# Patient Record
Sex: FEMALE | Race: WHITE | NOT HISPANIC OR LATINO | URBAN - METROPOLITAN AREA
[De-identification: names, ages, dates, MRNs, and addresses within clinical notes are randomized per-mention and may not be internally consistent; named-entity substitution may affect disease eponyms.]

---

## 2022-06-14 ENCOUNTER — APPOINTMENT (OUTPATIENT)
Age: 37
Setting detail: DERMATOLOGY
End: 2022-06-14

## 2022-06-14 ENCOUNTER — APPOINTMENT (RX ONLY)
Age: 37
Setting detail: DERMATOLOGY
End: 2022-06-14

## 2022-06-14 DIAGNOSIS — Z41.9 ENCOUNTER FOR PROCEDURE FOR PURPOSES OTHER THAN REMEDYING HEALTH STATE, UNSPECIFIED: ICD-10-CM

## 2022-06-14 PROCEDURE — ? HYALURONIDASE INJECTION

## 2022-06-14 ASSESSMENT — LOCATION ZONE DERM
LOCATION ZONE: LIP
LOCATION ZONE: LIP

## 2022-06-14 ASSESSMENT — LOCATION SIMPLE DESCRIPTION DERM
LOCATION SIMPLE: RIGHT LIP
LOCATION SIMPLE: LEFT LIP
LOCATION SIMPLE: RIGHT LIP
LOCATION SIMPLE: LEFT LIP

## 2022-06-14 ASSESSMENT — LOCATION DETAILED DESCRIPTION DERM
LOCATION DETAILED: LEFT INFERIOR VERMILION LIP
LOCATION DETAILED: RIGHT SUPERIOR VERMILION LIP
LOCATION DETAILED: RIGHT SUPERIOR VERMILION LIP
LOCATION DETAILED: LEFT SUPERIOR VERMILION LIP
LOCATION DETAILED: LEFT SUPERIOR VERMILION LIP
LOCATION DETAILED: LEFT INFERIOR VERMILION LIP

## 2022-06-14 NOTE — PROCEDURE: HYALURONIDASE INJECTION
Detail Level: Detailed
Hyaluronidase Preparation: hyaluronidase
Administered By (Optional): Jim Lane DO
Lot # (Optional): [id number]
Price (Use Numbers Only, No Special Characters Or $): 75
Total Volume (Ccs): 0.2
Expiration Date (Optional): 08/2021
Consent: The risks of contour defects and dimpling of the skin were reviewed with the patient prior to the injection.

## 2022-06-14 NOTE — PROCEDURE: HYALURONIDASE INJECTION
Total Volume (Ccs): 0.2
Price (Use Numbers Only, No Special Characters Or $): 75
Detail Level: Detailed
Lot # (Optional): [id number]
Administered By (Optional): Srinath Paulino DO
Consent: The risks of contour defects and dimpling of the skin were reviewed with the patient prior to the injection.
Expiration Date (Optional): 08/2021
Hyaluronidase Preparation: hyaluronidase

## 2022-06-27 ENCOUNTER — APPOINTMENT (OUTPATIENT)
Age: 37
Setting detail: DERMATOLOGY
End: 2022-06-27

## 2022-06-27 ENCOUNTER — APPOINTMENT (RX ONLY)
Age: 37
Setting detail: DERMATOLOGY
End: 2022-06-27

## 2022-06-27 DIAGNOSIS — D18.0 HEMANGIOMA: ICD-10-CM

## 2022-06-27 DIAGNOSIS — Z41.9 ENCOUNTER FOR PROCEDURE FOR PURPOSES OTHER THAN REMEDYING HEALTH STATE, UNSPECIFIED: ICD-10-CM

## 2022-06-27 PROBLEM — D18.01 HEMANGIOMA OF SKIN AND SUBCUTANEOUS TISSUE: Status: ACTIVE | Noted: 2022-06-27

## 2022-06-27 PROCEDURE — ? BENIGN DESTRUCTION COSMETIC

## 2022-06-27 PROCEDURE — ? HYALURONIDASE INJECTION

## 2022-06-27 ASSESSMENT — LOCATION ZONE DERM
LOCATION ZONE: LIP
LOCATION ZONE: LIP

## 2022-06-27 ASSESSMENT — LOCATION DETAILED DESCRIPTION DERM
LOCATION DETAILED: RIGHT SUPERIOR VERMILION LIP
LOCATION DETAILED: LEFT SUPERIOR VERMILION LIP
LOCATION DETAILED: RIGHT INFERIOR VERMILION LIP
LOCATION DETAILED: RIGHT SUPERIOR VERMILION LIP
LOCATION DETAILED: RIGHT INFERIOR VERMILION LIP
LOCATION DETAILED: LEFT SUPERIOR VERMILION LIP

## 2022-06-27 ASSESSMENT — LOCATION SIMPLE DESCRIPTION DERM
LOCATION SIMPLE: LEFT LIP
LOCATION SIMPLE: RIGHT LIP
LOCATION SIMPLE: LEFT LIP
LOCATION SIMPLE: RIGHT LIP

## 2022-06-27 NOTE — PROCEDURE: BENIGN DESTRUCTION COSMETIC
Consent: The patient's consent was obtained including but not limited to risks of crusting, scabbing, blistering, scarring, darker or lighter pigmentary change, recurrence, incomplete removal and infection.
Anesthesia Volume In Cc: 0
Detail Level: Detailed
Topical Anesthesia?: BLT cream (benzocaine 10%, lidocaine 4%, tetracaine 2%)
Post-Care Instructions: I reviewed with the patient in detail post-care instructions. Patient is to wear sunprotection, and avoid picking at any of the treated lesions. Pt may apply Vaseline to crusted or scabbing areas.

## 2022-06-27 NOTE — PROCEDURE: BENIGN DESTRUCTION COSMETIC
Detail Level: Detailed
Post-Care Instructions: I reviewed with the patient in detail post-care instructions. Patient is to wear sunprotection, and avoid picking at any of the treated lesions. Pt may apply Vaseline to crusted or scabbing areas.
Anesthesia Volume In Cc: 0
Consent: The patient's consent was obtained including but not limited to risks of crusting, scabbing, blistering, scarring, darker or lighter pigmentary change, recurrence, incomplete removal and infection.
Topical Anesthesia?: BLT cream (benzocaine 10%, lidocaine 4%, tetracaine 2%)

## 2022-06-27 NOTE — PROCEDURE: HYALURONIDASE INJECTION
Administered By (Optional): Dr. Lane
Treatment Number (Optional): 2
Price (Use Numbers Only, No Special Characters Or $): 75
Hyaluronidase Preparation: hyaluronidase
Consent: The risks of contour defects and dimpling of the skin were reviewed with the patient prior to the injection.
Detail Level: Detailed
Total Volume (Ccs): 0.5
Lot # (Optional): [id number]

## 2022-06-27 NOTE — PROCEDURE: HYALURONIDASE INJECTION
Total Volume (Ccs): 0.5
Hyaluronidase Preparation: hyaluronidase
Treatment Number (Optional): 2
Administered By (Optional): Dr. Paulino
Lot # (Optional): [id number]
Consent: The risks of contour defects and dimpling of the skin were reviewed with the patient prior to the injection.
Detail Level: Detailed
Price (Use Numbers Only, No Special Characters Or $): 75

## 2022-07-14 ENCOUNTER — APPOINTMENT (OUTPATIENT)
Age: 37
Setting detail: DERMATOLOGY
End: 2022-07-14

## 2022-07-14 ENCOUNTER — APPOINTMENT (RX ONLY)
Age: 37
Setting detail: DERMATOLOGY
End: 2022-07-14

## 2022-07-14 DIAGNOSIS — Z41.9 ENCOUNTER FOR PROCEDURE FOR PURPOSES OTHER THAN REMEDYING HEALTH STATE, UNSPECIFIED: ICD-10-CM

## 2022-07-14 PROCEDURE — ? FILLERS

## 2022-07-14 ASSESSMENT — LOCATION ZONE DERM
LOCATION ZONE: FACE
LOCATION ZONE: FACE
LOCATION ZONE: LIP
LOCATION ZONE: LIP

## 2022-07-14 ASSESSMENT — LOCATION DETAILED DESCRIPTION DERM
LOCATION DETAILED: LEFT LATERAL MANDIBULAR CHEEK
LOCATION DETAILED: RIGHT LATERAL MANDIBULAR CHEEK
LOCATION DETAILED: RIGHT INFERIOR VERMILION LIP
LOCATION DETAILED: RIGHT LATERAL MANDIBULAR CHEEK
LOCATION DETAILED: RIGHT INFERIOR VERMILION LIP
LOCATION DETAILED: RIGHT MEDIAL BUCCAL CHEEK
LOCATION DETAILED: RIGHT SUPERIOR VERMILION LIP
LOCATION DETAILED: LEFT SUPERIOR VERMILION LIP
LOCATION DETAILED: LEFT LOWER CUTANEOUS LIP
LOCATION DETAILED: LEFT LATERAL MANDIBULAR CHEEK
LOCATION DETAILED: LEFT SUPERIOR VERMILION LIP
LOCATION DETAILED: LEFT INFERIOR VERMILION LIP
LOCATION DETAILED: RIGHT SUPERIOR VERMILION LIP
LOCATION DETAILED: LEFT CHIN
LOCATION DETAILED: LEFT CHIN
LOCATION DETAILED: LEFT INFERIOR VERMILION LIP
LOCATION DETAILED: RIGHT MEDIAL BUCCAL CHEEK
LOCATION DETAILED: RIGHT INFERIOR CENTRAL BUCCAL CHEEK
LOCATION DETAILED: LEFT INFERIOR CENTRAL BUCCAL CHEEK
LOCATION DETAILED: LEFT LOWER CUTANEOUS LIP
LOCATION DETAILED: RIGHT INFERIOR CENTRAL BUCCAL CHEEK
LOCATION DETAILED: LEFT INFERIOR CENTRAL BUCCAL CHEEK

## 2022-07-14 ASSESSMENT — LOCATION SIMPLE DESCRIPTION DERM
LOCATION SIMPLE: RIGHT CHEEK
LOCATION SIMPLE: LEFT CHEEK
LOCATION SIMPLE: CHIN
LOCATION SIMPLE: LEFT LIP
LOCATION SIMPLE: RIGHT CHEEK
LOCATION SIMPLE: LEFT CHEEK
LOCATION SIMPLE: RIGHT LIP
LOCATION SIMPLE: RIGHT LIP
LOCATION SIMPLE: CHIN
LOCATION SIMPLE: LEFT LIP

## 2022-07-14 NOTE — PROCEDURE: FILLERS
Nasolabial Folds Filler Volume In Cc: 0
Additional Area 1 Volume In Cc: 1.5
Include Cannula Information In Note?: No
Filler: Radiesse+
Consent: Written consent obtained. Risks include but not limited to bruising, beading, irregular texture, ulceration, infection, allergic reaction, scar formation, incomplete augmentation, temporary nature, and procedural pain.
Aspiration Statement: Aspiration was performed prior to injecting site with filler.
Post-Care Instructions: After the procedure, patient instructed to apply ice to reduce swelling.
Vermilion Lips Filler Volume In Cc: 1
Lot #: [id number]
Anesthesia Type: 1% lidocaine with epinephrine
Anesthesia Volume In Cc: 0.5
Expiration Date (Month Year): 5/2023
Additional Anesthesia Volume In Cc: 6
Lot #: [id number]
Detail Level: Detailed
Expiration Date (Month Year): 5/2024
Include Cannula Information In Note?: Yes
Price (Use Numbers Only, No Special Characters Or $): 0869
Filler: Restylane Kysse
Map Statment: See Attach Map for Complete Details
Additional Area 1 Location: chin

## 2022-07-14 NOTE — PROCEDURE: FILLERS
Nasolabial Folds Filler Volume In Cc: 0
Post-Care Instructions: After the procedure, patient instructed to apply ice to reduce swelling.
Aspiration Statement: Aspiration was performed prior to injecting site with filler.
Include Cannula Information In Note?: No
Lot #: [id number]
Expiration Date (Month Year): 5/2024
Filler: Radiesse+
Expiration Date (Month Year): 5/2023
Detail Level: Detailed
Price (Use Numbers Only, No Special Characters Or $): 3084
Anesthesia Volume In Cc: 0.5
Vermilion Lips Filler Volume In Cc: 1
Lot #: [id number]
Additional Area 1 Volume In Cc: 1.5
Anesthesia Type: 1% lidocaine with epinephrine
Include Cannula Information In Note?: Yes
Map Statment: See Attach Map for Complete Details
Consent: Written consent obtained. Risks include but not limited to bruising, beading, irregular texture, ulceration, infection, allergic reaction, scar formation, incomplete augmentation, temporary nature, and procedural pain.
Filler: Restylane Kysse
Additional Anesthesia Volume In Cc: 6
Additional Area 1 Location: chin

## 2022-07-25 ENCOUNTER — APPOINTMENT (OUTPATIENT)
Age: 37
Setting detail: DERMATOLOGY
End: 2022-07-25

## 2022-07-25 ENCOUNTER — APPOINTMENT (RX ONLY)
Age: 37
Setting detail: DERMATOLOGY
End: 2022-07-25

## 2022-07-25 DIAGNOSIS — Z41.9 ENCOUNTER FOR PROCEDURE FOR PURPOSES OTHER THAN REMEDYING HEALTH STATE, UNSPECIFIED: ICD-10-CM

## 2022-07-25 PROCEDURE — ? COSMETIC FOLLOW-UP

## 2022-07-25 PROCEDURE — ? ADDITIONAL NOTES

## 2022-07-25 PROCEDURE — ? FILLERS

## 2022-07-25 ASSESSMENT — LOCATION SIMPLE DESCRIPTION DERM
LOCATION SIMPLE: LEFT UPPER LIP
LOCATION SIMPLE: RIGHT LIP
LOCATION SIMPLE: LEFT LIP
LOCATION SIMPLE: LEFT UPPER LIP
LOCATION SIMPLE: LEFT LIP
LOCATION SIMPLE: RIGHT LIP

## 2022-07-25 ASSESSMENT — LOCATION DETAILED DESCRIPTION DERM
LOCATION DETAILED: LEFT SUPERIOR VERMILION LIP
LOCATION DETAILED: RIGHT SUPERIOR VERMILION LIP
LOCATION DETAILED: LEFT SUPERIOR VERMILION BORDER
LOCATION DETAILED: RIGHT SUPERIOR VERMILION LIP
LOCATION DETAILED: LEFT SUPERIOR VERMILION LIP
LOCATION DETAILED: LEFT SUPERIOR VERMILION BORDER

## 2022-07-25 ASSESSMENT — LOCATION ZONE DERM
LOCATION ZONE: LIP
LOCATION ZONE: LIP

## 2022-07-25 NOTE — PROCEDURE: ADDITIONAL NOTES
Detail Level: Simple
Render Risk Assessment In Note?: no
Additional Notes: Patient advised that 1 unit of Botox can be added to the upper left lip to assist with symmetry. Patient declines this treatment option at this time.

## 2022-07-25 NOTE — PROCEDURE: COSMETIC FOLLOW-UP
Detail Level: Zone
Patient Satisfaction: Very pleased
Side Effects Or Complications: None
Treatment (Optional): Filler Injection
Price (Use Numbers Only, No Special Characters Or $): 0
Global Improvement: Very Good

## 2022-07-25 NOTE — PROCEDURE: COSMETIC FOLLOW-UP
Detail Level: Zone
Patient Satisfaction: Very pleased
Treatment (Optional): Filler Injection
Price (Use Numbers Only, No Special Characters Or $): 0
Global Improvement: Very Good
Side Effects Or Complications: None

## 2022-07-25 NOTE — PROCEDURE: FILLERS
Mid Face Filler Volume In Cc: 0
Post-Care Instructions: After the procedure, patient instructed to apply ice to reduce swelling.
Anesthesia Type: 1% lidocaine with epinephrine
Include Cannula Information In Note?: No
Anesthesia Volume In Cc: 0.5
Additional Anesthesia Volume In Cc: 6
Topical Anesthesia?: BLT cream (benzocaine 20%, lidocaine 10%, tetracaine 10%)
Detail Level: Detailed
Filler: Restylane Kysse
Map Statment: See Attach Map for Complete Details
Vermilion Lips Filler Volume In Cc: 0.2
Aspiration Statement: Aspiration was performed prior to injecting site with filler.
Consent: Written consent obtained. Risks include but not limited to bruising, beading, irregular texture, ulceration, infection, allergic reaction, scar formation, incomplete augmentation, temporary nature, and procedural pain.

## 2022-07-25 NOTE — PROCEDURE: ADDITIONAL NOTES
Render Risk Assessment In Note?: no
Detail Level: Simple
Additional Notes: Patient advised that 1 unit of Botox can be added to the upper left lip to assist with symmetry. Patient declines this treatment option at this time.

## 2022-07-25 NOTE — PROCEDURE: FILLERS
Decollete Filler Volume In Cc: 0
Filler: Restylane Kysse
Additional Anesthesia Volume In Cc: 6
Include Cannula Information In Note?: No
Topical Anesthesia?: BLT cream (benzocaine 20%, lidocaine 10%, tetracaine 10%)
Consent: Written consent obtained. Risks include but not limited to bruising, beading, irregular texture, ulceration, infection, allergic reaction, scar formation, incomplete augmentation, temporary nature, and procedural pain.
Map Statment: See Attach Map for Complete Details
Aspiration Statement: Aspiration was performed prior to injecting site with filler.
Anesthesia Type: 1% lidocaine with epinephrine
Anesthesia Volume In Cc: 0.5
Detail Level: Detailed
Post-Care Instructions: After the procedure, patient instructed to apply ice to reduce swelling.
Vermilion Lips Filler Volume In Cc: 0.2

## 2022-08-08 ENCOUNTER — APPOINTMENT (OUTPATIENT)
Age: 37
Setting detail: DERMATOLOGY
End: 2022-08-08

## 2022-08-08 ENCOUNTER — APPOINTMENT (RX ONLY)
Age: 37
Setting detail: DERMATOLOGY
End: 2022-08-08

## 2022-08-08 DIAGNOSIS — Z41.9 ENCOUNTER FOR PROCEDURE FOR PURPOSES OTHER THAN REMEDYING HEALTH STATE, UNSPECIFIED: ICD-10-CM

## 2022-08-08 PROCEDURE — ? COSMETIC FOLLOW-UP

## 2022-08-08 PROCEDURE — ? BOTOX

## 2022-08-08 ASSESSMENT — LOCATION DETAILED DESCRIPTION DERM
LOCATION DETAILED: LEFT SUPERIOR VERMILION LIP
LOCATION DETAILED: RIGHT CHIN
LOCATION DETAILED: LEFT SUPERIOR VERMILION LIP
LOCATION DETAILED: RIGHT CHIN

## 2022-08-08 ASSESSMENT — LOCATION SIMPLE DESCRIPTION DERM
LOCATION SIMPLE: LEFT LIP
LOCATION SIMPLE: CHIN
LOCATION SIMPLE: LEFT LIP
LOCATION SIMPLE: CHIN

## 2022-08-08 ASSESSMENT — LOCATION ZONE DERM
LOCATION ZONE: LIP
LOCATION ZONE: LIP
LOCATION ZONE: FACE
LOCATION ZONE: FACE

## 2022-08-08 NOTE — PROCEDURE: BOTOX
Expiration Date (Month Year): 3/2024
Lcl Root Units: 0
Show Additional Area 2: Yes
Price (Use Numbers Only, No Special Characters Or $): 13
Consent: Written consent obtained. Risks include but not limited to lid/brow ptosis, bruising, swelling, diplopia, temporary effect, incomplete chemical denervation.
Show Ucl Units: No
Post-Care Instructions: Patient instructed to not lie down for 4 hours and limit physical activity for 24 hours.
Detail Level: Detailed
Lot #: [id number]
Dilution (U/0.1 Cc): 4
Periorbital Skin Units: 1

## 2022-08-08 NOTE — PROCEDURE: COSMETIC FOLLOW-UP
Price (Use Numbers Only, No Special Characters Or $): 0
Global Improvement: Very Good
Patient Satisfaction: Very pleased
Treatment (Optional): Filler Injection
Detail Level: Zone
Side Effects Or Complications: None

## 2022-08-08 NOTE — PROCEDURE: BOTOX
Left Pupillary Line Units: 0
Show Right And Left Brow Units: No
Show Orbicularis Oculi Units: Yes
Price (Use Numbers Only, No Special Characters Or $): 13
Post-Care Instructions: Patient instructed to not lie down for 4 hours and limit physical activity for 24 hours.
Periorbital Skin Units: 1
Detail Level: Detailed
Lot #: [id number]
Expiration Date (Month Year): 3/2024
Consent: Written consent obtained. Risks include but not limited to lid/brow ptosis, bruising, swelling, diplopia, temporary effect, incomplete chemical denervation.
Dilution (U/0.1 Cc): 4

## 2022-08-08 NOTE — PROCEDURE: COSMETIC FOLLOW-UP
Detail Level: Zone
Price (Use Numbers Only, No Special Characters Or $): 0
Global Improvement: Very Good
Side Effects Or Complications: None
Patient Satisfaction: Very pleased
Treatment (Optional): Filler Injection

## 2022-08-17 ENCOUNTER — APPOINTMENT (OUTPATIENT)
Age: 37
Setting detail: DERMATOLOGY
End: 2022-08-17

## 2022-08-17 ENCOUNTER — APPOINTMENT (RX ONLY)
Age: 37
Setting detail: DERMATOLOGY
End: 2022-08-17

## 2022-08-17 DIAGNOSIS — Z41.9 ENCOUNTER FOR PROCEDURE FOR PURPOSES OTHER THAN REMEDYING HEALTH STATE, UNSPECIFIED: ICD-10-CM

## 2022-08-17 PROCEDURE — ? COSMETIC FOLLOW-UP

## 2022-08-17 ASSESSMENT — LOCATION SIMPLE DESCRIPTION DERM
LOCATION SIMPLE: LEFT LIP
LOCATION SIMPLE: LEFT LIP

## 2022-08-17 ASSESSMENT — LOCATION DETAILED DESCRIPTION DERM
LOCATION DETAILED: LEFT INFERIOR VERMILION LIP
LOCATION DETAILED: LEFT SUPERIOR VERMILION LIP
LOCATION DETAILED: LEFT INFERIOR VERMILION LIP
LOCATION DETAILED: LEFT SUPERIOR VERMILION LIP

## 2022-08-17 ASSESSMENT — LOCATION ZONE DERM
LOCATION ZONE: LIP
LOCATION ZONE: LIP

## 2022-08-17 NOTE — PROCEDURE: COSMETIC FOLLOW-UP
Price (Use Numbers Only, No Special Characters Or $): 0
Detail Level: Zone
Patient Satisfaction: Very pleased
Global Improvement: Very Good
Side Effects Or Complications: None

## 2022-08-17 NOTE — PROCEDURE: COSMETIC FOLLOW-UP
Patient Satisfaction: Very pleased
Detail Level: Zone
Side Effects Or Complications: None
Price (Use Numbers Only, No Special Characters Or $): 0
Global Improvement: Very Good